# Patient Record
(demographics unavailable — no encounter records)

---

## 2024-12-16 NOTE — ASSESSMENT
[FreeTextEntry1] : CPE CBC, CMP, A1c, lipid, UA, TFTs, stool FIT EKG is sinus rhythm, unchanged from previous Up-to-date with mammography Sees [facility] [place] due, Will schedule.  Up-to-date with bone density.  1.  History of elevated lipids Recheck CMP, lipid profile  2.  Osteopenia TFTs, vitamin D level  3. White coat HTN Home BPs- [id number]

## 2024-12-16 NOTE — HISTORY OF PRESENT ILLNESS
[FreeTextEntry1] : CPE [de-identified] : Aminah is here today for CPE.  She remains on no prescription medications, though continues to take her vitamin supplements.  He was interested in seeing what her lipids would be like offered to use RICE, though will wait for these results.  No other acute concerns

## 2024-12-16 NOTE — HEALTH RISK ASSESSMENT
[Yes] : Yes [No] : In the past 12 months have you used drugs other than those required for medical reasons? No [0] : 2) Feeling down, depressed, or hopeless: Not at all (0) [PHQ-2 Negative - No further assessment needed] : PHQ-2 Negative - No further assessment needed [[id number]] : 0 [Never] : Never [Patient reported mammogram was normal] : Patient reported mammogram was normal [Patient reported PAP Smear was normal] : Patient reported PAP Smear was normal [Patient reported bone density results were abnormal] : Patient reported bone density results were abnormal [BoneDensityComments] : Due for repeat.  Rx given [ColonoscopyComments] : 2019, recommended 5-year follow-up.  Due.  Advised to schedule.